# Patient Record
Sex: FEMALE | Race: WHITE
[De-identification: names, ages, dates, MRNs, and addresses within clinical notes are randomized per-mention and may not be internally consistent; named-entity substitution may affect disease eponyms.]

---

## 2017-08-14 ENCOUNTER — HOSPITAL ENCOUNTER (OUTPATIENT)
Dept: HOSPITAL 35 - PAIN | Age: 47
Discharge: HOME | End: 2017-08-14
Attending: ANESTHESIOLOGY
Payer: COMMERCIAL

## 2017-08-14 VITALS — SYSTOLIC BLOOD PRESSURE: 129 MMHG | DIASTOLIC BLOOD PRESSURE: 95 MMHG

## 2017-08-14 VITALS — BODY MASS INDEX: 32.44 KG/M2 | HEIGHT: 60.98 IN | WEIGHT: 171.8 LBS

## 2017-08-14 DIAGNOSIS — G57.00: ICD-10-CM

## 2017-08-14 DIAGNOSIS — Z79.82: ICD-10-CM

## 2017-08-14 DIAGNOSIS — M25.571: ICD-10-CM

## 2017-08-14 DIAGNOSIS — Z88.8: ICD-10-CM

## 2017-08-14 DIAGNOSIS — M53.3: ICD-10-CM

## 2017-08-14 DIAGNOSIS — M79.1: Primary | ICD-10-CM

## 2017-08-14 DIAGNOSIS — R22.41: ICD-10-CM

## 2018-01-25 ENCOUNTER — HOSPITAL ENCOUNTER (OUTPATIENT)
Dept: HOSPITAL 35 - RAD | Age: 48
End: 2018-01-25
Payer: COMMERCIAL

## 2018-01-25 DIAGNOSIS — Z12.31: Primary | ICD-10-CM

## 2018-03-28 ENCOUNTER — HOSPITAL ENCOUNTER (OUTPATIENT)
Dept: HOSPITAL 35 - MRI | Age: 48
End: 2018-03-28
Attending: ORTHOPAEDIC SURGERY
Payer: COMMERCIAL

## 2018-03-28 DIAGNOSIS — M25.571: Primary | ICD-10-CM

## 2019-04-25 ENCOUNTER — HOSPITAL ENCOUNTER (OUTPATIENT)
Dept: HOSPITAL 35 - RAD | Age: 49
End: 2019-04-25
Payer: COMMERCIAL

## 2019-04-25 DIAGNOSIS — Z12.31: Primary | ICD-10-CM

## 2019-09-24 ENCOUNTER — HOSPITAL ENCOUNTER (OUTPATIENT)
Dept: HOSPITAL 35 - CAT | Age: 49
End: 2019-09-24
Attending: FAMILY MEDICINE
Payer: COMMERCIAL

## 2019-09-24 DIAGNOSIS — Z13.6: Primary | ICD-10-CM

## 2019-09-24 DIAGNOSIS — I25.10: ICD-10-CM

## 2019-09-24 DIAGNOSIS — E78.00: ICD-10-CM

## 2020-08-19 ENCOUNTER — HOSPITAL ENCOUNTER (OUTPATIENT)
Dept: HOSPITAL 35 - RAD | Age: 50
End: 2020-08-19
Attending: GENERAL ACUTE CARE HOSPITAL
Payer: COMMERCIAL

## 2020-08-19 DIAGNOSIS — Z12.31: Primary | ICD-10-CM

## 2021-10-29 ENCOUNTER — HOSPITAL ENCOUNTER (OUTPATIENT)
Dept: HOSPITAL 35 - BC | Age: 51
End: 2021-10-29
Attending: FAMILY MEDICINE
Payer: COMMERCIAL

## 2021-10-29 DIAGNOSIS — Z12.31: Primary | ICD-10-CM

## 2021-11-30 ENCOUNTER — HOSPITAL ENCOUNTER (OUTPATIENT)
Dept: HOSPITAL 35 - LAB | Age: 51
End: 2021-11-30
Attending: FAMILY MEDICINE
Payer: COMMERCIAL

## 2021-11-30 DIAGNOSIS — Z00.00: Primary | ICD-10-CM

## 2021-11-30 LAB
ALBUMIN SERPL-MCNC: 4.2 G/DL (ref 3.4–5)
ALT SERPL-CCNC: 20 U/L (ref 30–65)
ANION GAP SERPL CALC-SCNC: 6 MMOL/L (ref 7–16)
AST SERPL-CCNC: 19 U/L (ref 15–37)
BASOPHILS NFR BLD AUTO: 0.4 % (ref 0–2)
BILIRUB SERPL-MCNC: 0.4 MG/DL (ref 0.2–1)
BILIRUB UR-MCNC: NEGATIVE MG/DL
BUN SERPL-MCNC: 21 MG/DL (ref 7–18)
CALCIUM SERPL-MCNC: 9.7 MG/DL (ref 8.5–10.1)
CHLORIDE SERPL-SCNC: 105 MMOL/L (ref 98–107)
CHOLEST SERPL-MCNC: 270 MG/DL (ref ?–200)
CO2 SERPL-SCNC: 28 MMOL/L (ref 21–32)
COLOR UR: YELLOW
CREAT SERPL-MCNC: 0.9 MG/DL (ref 0.6–1)
EOSINOPHIL NFR BLD: 4.6 % (ref 0–3)
ERYTHROCYTE [DISTWIDTH] IN BLOOD BY AUTOMATED COUNT: 12.3 % (ref 10.5–14.5)
GLUCOSE SERPL-MCNC: 86 MG/DL (ref 74–106)
GRANULOCYTES NFR BLD MANUAL: 50.3 % (ref 36–66)
HCT VFR BLD CALC: 47.5 % (ref 37–47)
HDLC SERPL-MCNC: 61 MG/DL (ref 40–?)
HGB BLD-MCNC: 16 GM/DL (ref 12–15)
KETONES UR STRIP-MCNC: NEGATIVE MG/DL
LDLC SERPL-MCNC: 187 MG/DL (ref ?–100)
LYMPHOCYTES NFR BLD AUTO: 38.1 % (ref 24–44)
MCH RBC QN AUTO: 30.3 PG (ref 26–34)
MCHC RBC AUTO-ENTMCNC: 33.7 G/DL (ref 28–37)
MCV RBC: 89.6 FL (ref 80–100)
MONOCYTES NFR BLD: 6.6 % (ref 1–8)
NEUTROPHILS # BLD: 4.3 THOU/UL (ref 1.4–8.2)
PLATELET # BLD: 320 THOU/UL (ref 150–400)
POTASSIUM SERPL-SCNC: 4.4 MMOL/L (ref 3.5–5.1)
PROT SERPL-MCNC: 8 G/DL (ref 6.4–8.2)
RBC # BLD AUTO: 5.3 MIL/UL (ref 4.2–5)
RBC # UR STRIP: NEGATIVE /UL
SODIUM SERPL-SCNC: 139 MMOL/L (ref 136–145)
SP GR UR STRIP: 1.02 (ref 1–1.03)
TC:HDL: 4.4 RATIO
TRIGL SERPL-MCNC: 114 MG/DL (ref ?–150)
URINE CLARITY: (no result)
URINE GLUCOSE-RANDOM*: NEGATIVE
URINE LEUKOCYTES-REFLEX: NEGATIVE
URINE NITRITE-REFLEX: NEGATIVE
URINE PROTEIN (DIPSTICK): NEGATIVE
UROBILINOGEN UR STRIP-ACNC: 0.2 E.U./DL (ref 0.2–1)
VLDLC SERPL CALC-MCNC: 23 MG/DL (ref ?–40)
WBC # BLD AUTO: 8.6 THOU/UL (ref 4–11)

## 2022-01-21 ENCOUNTER — HOSPITAL ENCOUNTER (OUTPATIENT)
Dept: HOSPITAL 35 - MRI | Age: 52
End: 2022-01-21
Attending: FAMILY MEDICINE
Payer: COMMERCIAL

## 2022-01-21 DIAGNOSIS — M50.123: Primary | ICD-10-CM

## 2022-01-21 DIAGNOSIS — M47.22: ICD-10-CM

## 2022-01-21 DIAGNOSIS — M48.02: ICD-10-CM

## 2022-01-21 DIAGNOSIS — M50.122: ICD-10-CM

## 2022-01-21 DIAGNOSIS — M50.121: ICD-10-CM

## 2022-02-15 ENCOUNTER — HOSPITAL ENCOUNTER (OUTPATIENT)
Dept: HOSPITAL 35 - PAIN | Age: 52
End: 2022-02-15
Attending: ANESTHESIOLOGY
Payer: COMMERCIAL

## 2022-02-15 VITALS — DIASTOLIC BLOOD PRESSURE: 78 MMHG | SYSTOLIC BLOOD PRESSURE: 122 MMHG

## 2022-02-15 VITALS — BODY MASS INDEX: 26.43 KG/M2 | HEIGHT: 60.98 IN | WEIGHT: 140 LBS

## 2022-02-15 DIAGNOSIS — Z79.899: ICD-10-CM

## 2022-02-15 DIAGNOSIS — G56.92: ICD-10-CM

## 2022-02-15 DIAGNOSIS — G56.02: ICD-10-CM

## 2022-02-15 DIAGNOSIS — M47.812: Primary | ICD-10-CM

## 2022-02-15 DIAGNOSIS — M48.02: ICD-10-CM

## 2022-02-15 DIAGNOSIS — M50.20: ICD-10-CM

## 2022-02-15 NOTE — HPC
[facility]
[address]
[place]   [address]                     PAIN MANAGEMENT CONSULTATION  
_______________________________________________________________________________
 
Name:       JULIANO GOVEA          Room #:                     REG [facility] 
MJORGE LUIS.#:      [id number]                       Account #:      [id number]  
Admission:  02/15/22    Attend Phys:    Gaurav Pompa DO  
Discharge:              Date of Birth:  [birth year]  
                                                          Report #: [id number]
                                                                    [id number] 
_______________________________________________________________________________
THIS REPORT FOR:  
 
cc:  Dayne Lugo MD,Gaurav Cantu MD, DO                                          ~
 
cc:     Dayne Lugo MD
DATE OF SERVICE: 02/15/2022
 
REFERRING PHYSICIAN:  Dayne Lugo MD
 
CHIEF COMPLAINT:  Left hand numbness.
 
HISTORY OF PRESENT ILLNESS:  As you know, the patient is a very pleasant 
51-year-old female who reports a longstanding history of progressively worsening
left hand pain and numbness and tingling.  She indicates that her pain intensity
began to increase in 12/2021, again no inciting injury or trauma that had led to
symptom development.  The patient had sought evaluation after discussions with 
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 
indicating that she is likely suffering from carpal tunnel syndrome.  She sought
evaluation through her primary care physician who was also concerned of cervical
radiculopathy.  The patient was then sent on to imaging where she underwent MRI 
of the cervical spine.  There were some changes noted in the cervical imaging 
study that prompted a referral on to our clinic as she was not improving with 
conservative treatment.  She has not tried cock-up splints.  She has not tried 
any topical agents to the area.  She has not been involved in any formalized 
physical therapy.  She denies injury or trauma to the area that may have caused 
symptom development.
 
The patient reports her pain is intermittent in nature.  She describes the pain 
as shooting, aching, throbbing, gnawing sharp, tender, numbness and tingling.  
Places current pain score at 2/10.  Daily average anywhere up to 4/10, worst 
pain has been as 8/10.  This is specifically when the patient is utilizing the 
left upper extremity with flexion of the wrist.  She indicates that pain is 
exacerbated with certain activities, improves with discontinuation of movement 
of the left wrist.  She has been referred to our service to discuss treatment 
options for left hand numbness and tingling and periodic left forearm numbness 
and tingling.
 
PAST MEDICAL HISTORY:  History of vertigo.
 
PAST SURGICAL HISTORY:
1.  Posterior cervical diskectomy.
2.  Breast augmentation.
3.  Tummy tuck.
 
 
 
 
[place] [address]                     PAIN MANAGEMENT CONSULTATION  
_______________________________________________________________________________
 
Name:       JULIANO GOVEA FLORENCIO          Room #:                     REG SERENA COCHRAN#:      [id number]                       Account #:      [id number]  
Admission:  02/15/22    Attend Phys:    Gaurav Pompa DO  
Discharge:              Date of Birth:  [birth year]  
                                                          Report #: [id number]
                                                                    [id number] 
_______________________________________________________________________________
SOCIAL HISTORY:  The patient denies tobacco use.  Denies IV or illicit drug use.
 Admits to an occasional alcohol beverage.  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  
She is working, not receiving workmen's compensation nor is she trying to obtain
disability benefits.  She is not in litigation in regards to pain.  She is 
unaccompanied at today's visit.
 
REVIEW OF SYSTEMS:  Positive for night sweats, fatigue and weakness, wearing 
corrective eyewear, blurred and double vision, hearing loss with tinnitus, left 
hand numbness and tingling from the wrist to the hand itself.  All other review 
of systems negative per 12-point review of systems other than those listed in 
history of present illness.
 
ALLERGIES:  No reported drug allergies.
 
CURRENT MEDICATIONS:  Adderall XR 20 mg once a day.
 
IMAGING:  MRI cervical spine obtained 01/21/2022 shows C1-C2 unremarkable, C2-C3
unremarkable, C3-C4 unremarkable, C4-C5 shows mild diffuse disk bulge, bilateral
uncovertebral degenerative changes, left foraminal area is widely patent.  There
is mild narrowing of the right neural foramen.  C5-C6 shows posterior left focal
disk protrusion causing mass effect upon the anterior left aspect of the canal, 
central canal dimension is maintained.  There is narrowing of the neural foramen
bilaterally.  Uncovertebral degenerative changes noted with moderately severe 
narrowing.  Focal disk protrusion anteriorly on the left, which causes mass 
effect upon the left neural foramen.  C6-C7 shows diffuse disk bulge, asymmetric
component, greater to the left.  Central canal is maintained.  Mild right 
foraminal encroachment.  No left encroachment.  C7-T1 unremarkable.
 
PHYSICAL EXAMINATION:
VITAL SIGNS:  Blood pressure 122/78, pulse is 65, respiratory rate 14 and 
unlabored.  The patient 100% on room air.  Height 5 feet 1 inch tall, weight 140
pounds, BMI calculated 26.5.
GENERAL:  Well-developed, well-nourished, well-hydrated 51-year-old female 
appearing stated age, pain is rated today at around 2-8/10 depending on 
activity.
HEENT:  Normocephalic, atraumatic.  Pupils equal, round and responsive.  She is 
wearing a mask in compliance with COVID-19 regulations and hospital policies.
LUNGS:  Clear, no wheeze, rhonchi or rales.
CARDIOVASCULAR:  Regular.  No appreciable gallop, no rub.
ABDOMEN:  Soft.
EXTREMITIES:  Show no clubbing, no cyanosis, no edema.
MUSCULOSKELETAL:  Upper extremity strength equal and symmetrical 5/5.  Muscle 
bulk and tone is equal and symmetrical in upper extremities including the 
intrinsic muscles of the hand bilaterally and thenar and hypothenar eminences.  
 strength is normal, though there is some pain generated with that maneuver 
on the left, negative right.  Tinel sign is positive on the left, negative 
 
 
 
[facility]
[address]
[place]   [address]                     PAIN MANAGEMENT CONSULTATION  
_______________________________________________________________________________
 
Name:       JULIANO GOVEA FLORENCIO          Room #:                     REG [facility] 
SCOTTY.#:      [id number]                       Account #:      [id number]  
Admission:  02/15/22    Attend Phys:    Gaurav Pompa DO  
Discharge:              Date of Birth:  [birth year]  
                                                          Report #: [id number]
                                                                    [id number] 
_______________________________________________________________________________
right.  Phalen's sign is positive on the left, negative right.  Spurling's test 
is negative bilaterally.  She is intact to light touch from C5 through T1 
dermatomes.
 
ASSESSMENT:
1.  Carpal tunnel of the left upper extremity.
2.  Peripheral neuropathy of the left upper extremity.
3.  Cervical spondylosis without definitive radiculopathy.
4.  Displacement of cervical intervertebral disk without definitive 
radiculopathy.
5.  Neural foraminal stenosis of the cervical spine without definitive 
radiculopathy.
 
PLAN:
1.  Based on today's physical exam, the history the patient provides, the 
distribution of symptoms the patient is experiencing pain within currently and 
the provocating factors that make her symptoms worse as well as the descriptors 
she uses in regards to pain, the likely source of this pain is carpal tunnel 
syndrome.  The patient has been advised that her symptoms do appear to be carpal
tunnel in origin by other physicians she has discussed her case with.  She has 
positive Tinel's and Phalen's sign on the left.  There is no radicular component
to the patient's symptoms that was elicited with physical exam today.  The 
patient is denying any current neck or left upper extremity symptoms and she has
had no proximal arm pain, but has had only distal symptoms radiating from the 
wrist joint towards the hand and then intermittently with retrograde towards the
elbow, but not to the upper arm.  Given these findings and physical exam and 
history she has provided, it does appear that her symptoms are related to carpal
tunnel syndrome.  We would recommend the following treatment.
2.  We recommend cock-up splints to be worn in the evening hour on the left 
upper extremity.  She can obtain these cock-up splints from any of her local 
pharmacies or through [facility].  We recommend she begin that treatment 
immediately.  She can obtain these devices easily over-the-counter.  There is no
reason for a prescription to be written.
3.  We discussed the possibility of starting medication, though given the 
findings and physical exam, oral medications might be met with side effects, 
specifically the neuropathic medications amitriptyline, nortriptyline, Cymbalta,
Lyrica or gabapentin.  We will recommend topical agents at this point directly 
to the area that in conjunction with the cock-up splinting should improve the 
carpal tunnel problem.
4.  The patient can apply topical Salonpas or Voltaren 1% diclofenac solution to
the area.  This should be done likely 3 times a day with a dressing limiting the
use of the left hand and flexion of the wrist if possible.  She could begin that
treatment immediately.
5.  We did discuss the possibility of having the patient seek treatment through 
physical therapy/occupational therapy to help with flexion issues on the left 
upper extremity.  She wishes to consider that option.  She does not want to 
 
 
 
[address] [place]   [address]                     PAIN MANAGEMENT CONSULTATION  
_______________________________________________________________________________
 
Name:       JULIANO GOVEA          Room #:                     REG SERENA COCHRAN#:      [id number]                       Account #:      [id number]  
Admission:  02/15/22    Attend Phys:    Gaurav Pompa DO  
Discharge:              Date of Birth:  [birth year]  
                                                          Report #: [id number]
                                                                    [id number] 
_______________________________________________________________________________
trial a formalized treatment until she has utilized cock-up splints and topical 
agents.
6.  We did discuss with the patient the possibility of undergoing a surgery to 
address the carpal tunnel area.  This will be done quite easily with release of 
the flexor retinaculum.  She will consider this option if her symptoms do not 
improve with conservative treatment.
7.  We wish to thank Dr. Lugo for the referral of the patient to our clinic.
 We will keep you apprised of response to treatment as we address her left upper
extremity carpal tunnel syndrome and any cervical radiculopathy that might 
develop from the findings of her recent MRI of the cervical spine.  Again, we 
wish to thank you for the opportunity to see the patient in consultation.
 
 
 
 
 
 
 
 
 
 
 
 
 
 
 
 
 
 
 
 
 
 
 
 
 
 
 
 
 
 
 
 
 
                         
   By:                               
                   
D: 02/16/22 0714                           _____________________________________
T: 02/16/22 0758                           Gaurav Pompa DO            /nt

## 2022-02-15 NOTE — NUR
Pain Clinic Assessment:
 
1. History of Osteoarthritis:
NECK DDD
   History of Rheumatoid Arthritis:
DENIES
 
2. Height: 5 ft. 1 in. 154.9 cm.
   Weight: 140.0 lb.  oz. 63.504 kg.
   Patient's BMI: 26.5
 
3. Vital Signs:
   BP: 122/78 Pulse: 65 Resp: 14
   Temp:  02 Sat: 100 ECG Mon:
 
4. Pain Intensity: 2 TO 8
 
5. Fall Risk:
   Dizziness: N  Needs help standing or walking: N
   Fallen in the last 3 months: N
   Fall risk comments:
 
 
6. Patient on Blood Thinner: None
 
7. History of Hypertension: N
 
8. Opioid Therapy greater than 6 weeks: N
   Opiate Contract Signed:
 
9. Risk Assessment Tool Provided: LOW-3
 
10. Functional Assessment Tool: 19/70
 
11. Recreational Drug Use: Never Drug Type:
    Tobacco Use: Former Smoker Tobacco Type:
       Amount or Packs/day:  How Many Years:
    Alcohol Use: Yes  Frequency: Monthly Quant: 2